# Patient Record
(demographics unavailable — no encounter records)

---

## 2025-03-10 NOTE — ASSESSMENT
[FreeTextEntry1] : 72 F w/ PMHx of obesity, HTN, hyperlipidemia, DM, gout, stress incontinence, lumbar stenosis and anxiety here for f/u. #1 HTN: Controlled and asymptomatic on her meds. Risks of uncontrolled hypertension explained and understood. Sees Dr Barreto at [facility].  #2 DM: Last A1C was 5.5% in dec 2025 with endo at [facility]. Weight loss stalled but still taking mounjaro so increase to 7.5mg weekly. Complications of DM explained including but not limited to MI/TIA/CVA/PAD/PVD/nephropathy/retinopathy/neuropathy/amputation and ultimately death. Recommendations include: 1800kcal ADA diet, Ophthalmologic evaluation annually for dilated Fundoscopic exam, Medication compliance and quarterly lab work for HgA1C, lipid panel. UTD with Optho and podiatry. Renew mounjaro and ordered test strips. UTD with optho and planning to see pod Naif. Renew FreeStyle Martha 14 Day Sensor #3 Obesity: Diet and exercise. Increase mounjaro to 7.5MG/0.5ML #4 Anxiety/chronic pain (neck)/DM neuropathy: Renewed duloxetine 30 mg. She is able to perform her ADLs. She is not having any homicidal or suicidal ideations. Saw neuro. but doesnt want to go for EMG.This could be a lumbar radiculopathy.  #5 Hx Gout/elevated uric acid level: Taking Allopurinol 300 mg QOD. Taking tart cherry. Check uric acid level. #6 Stress incontinence: Unchanged and being monitored. #7 Hyperlipidemia/fatty liver/elevated LFTs: Not taking statin. Follow a low cholesterol diet, exercise and lose weight. Check lipids and LFTs. Start Rosuvastatin Calcium 10 MG Oral Tablet. #8 Osteoporosis/vitamin D def: Recheck vitamin D level. Due for DEXA. #9 GERD: GERD diet and precautions.  #10 HCM: UTD with CPE. BW done today. All the patient's questions and concerns were answered at the time of the visit. The patient is agreeable to above treatment plan. Mammo and dexa script given. UTD with cologard. Refuses prevnar 20. F/u every 4 months.

## 2025-03-10 NOTE — PHYSICAL EXAM
[No Acute Distress] : no acute distress [Well-Appearing] : well-appearing [Normal Sclera/Conjunctiva] : normal sclera/conjunctiva [PERRL] : pupils equal round and reactive to light [EOMI] : extraocular movements intact [Normal Outer Ear/Nose] : the outer ears and nose were normal in appearance [Normal Oropharynx] : the oropharynx was normal [No JVD] : no jugular venous distention [No Lymphadenopathy] : no lymphadenopathy [Supple] : supple [No Respiratory Distress] : no respiratory distress  [No Accessory Muscle Use] : no accessory muscle use [Clear to Auscultation] : lungs were clear to auscultation bilaterally [Normal Rate] : normal rate  [Regular Rhythm] : with a regular rhythm [Normal S1, S2] : normal S1 and S2 [No Edema] : there was no peripheral edema [Soft] : abdomen soft [Non Tender] : non-tender [Non-distended] : non-distended [Normal Bowel Sounds] : normal bowel sounds [Normal Posterior Cervical Nodes] : no posterior cervical lymphadenopathy [Normal Anterior Cervical Nodes] : no anterior cervical lymphadenopathy [No CVA Tenderness] : no CVA  tenderness [No Spinal Tenderness] : no spinal tenderness [No Joint Swelling] : no joint swelling [Grossly Normal Strength/Tone] : grossly normal strength/tone [No Rash] : no rash [Coordination Grossly Intact] : coordination grossly intact [No Focal Deficits] : no focal deficits [Normal Gait] : normal gait [Deep Tendon Reflexes (DTR)] : deep tendon reflexes were 2+ and symmetric [Normal Affect] : the affect was normal [Normal Insight/Judgement] : insight and judgment were intact [Normal TMs] : both tympanic membranes were normal [Normal Nasal Mucosa] : the nasal mucosa was normal [No Murmur] : no murmur heard [Speech Grossly Normal] : speech grossly normal [Normal Mood] : the mood was normal [Comprehensive Foot Exam Normal] : Right and left foot were examined and both feet are normal. No ulcers in either foot. Toes are normal and with full ROM.  Normal tactile sensation with monofilament testing throughout both feet

## 2025-03-10 NOTE — HISTORY OF PRESENT ILLNESS
[FreeTextEntry1] : f/u [de-identified] : 73 F w/ PMHx of obesity, HTN, hyperlipidemia, DM, gout, stress incontinence, lumbar stenosis and anxiety here for f/u. Hx of gout and on allopurinol daily.  Weight loss has stalled on mounjaro 5mg. She has been feeling much better after switching to this. Not exercising or drinking enough water.  HTN controlled on metoprolol and telmisartan. Sees cardio Dr Barreto at [facility]. DM is well controlled DM neuropathy of her feet and doing well on cymbalta. UTD Optho. Last A1C in Dec was normal.  She has high cholesterol and is not on a statin. Pt claims her recent increase in cholesterol may be due to her eating habits. She went to the dentist and was told she grinds her teeth. She is eating and sleeping well. Pt denies any current fevers, chills, cold symptoms, headaches, dizziness, blurry vision, chest pain, palpitations, SOB, FRENCH, cough, abdominal pain, urinary symptoms or any n/v.  is HCP and she is full code. Due for Mammo and DEXA. Reports cologard 2022 and will call for copy. Consider prevnar 20 at time of CPE.

## 2025-03-10 NOTE — REVIEW OF SYSTEMS
[Negative] : Heme/Lymph [Recent Change In Weight] : ~T recent weight change [FreeTextEntry2] : -6lbs [FreeTextEntry1] : DM, HTN, obesity